# Patient Record
Sex: MALE | Race: WHITE | NOT HISPANIC OR LATINO | Employment: FULL TIME | URBAN - METROPOLITAN AREA
[De-identification: names, ages, dates, MRNs, and addresses within clinical notes are randomized per-mention and may not be internally consistent; named-entity substitution may affect disease eponyms.]

---

## 2018-03-05 ENCOUNTER — NON-PROVIDER VISIT (OUTPATIENT)
Dept: URGENT CARE | Facility: CLINIC | Age: 33
End: 2018-03-05

## 2018-03-05 DIAGNOSIS — Z02.1 PRE-EMPLOYMENT DRUG SCREENING: ICD-10-CM

## 2018-03-05 LAB
AMP AMPHETAMINE: NORMAL
COC COCAINE: NORMAL
INT CON NEG: NORMAL
INT CON POS: NORMAL
MET METHAMPHETAMINES: NORMAL
OPI OPIATES: NORMAL
PCP PHENCYCLIDINE: NORMAL
POC DRUG COMMENT 753798-OCCUPATIONAL HEALTH: NEGATIVE
THC: NORMAL

## 2018-03-05 PROCEDURE — 80305 DRUG TEST PRSMV DIR OPT OBS: CPT | Performed by: PHYSICIAN ASSISTANT

## 2023-05-11 ENCOUNTER — OFFICE VISIT (OUTPATIENT)
Dept: URGENT CARE | Facility: CLINIC | Age: 38
End: 2023-05-11
Payer: COMMERCIAL

## 2023-05-11 VITALS
BODY MASS INDEX: 25.33 KG/M2 | HEIGHT: 72 IN | HEART RATE: 76 BPM | DIASTOLIC BLOOD PRESSURE: 76 MMHG | OXYGEN SATURATION: 95 % | TEMPERATURE: 98.1 F | WEIGHT: 187 LBS | SYSTOLIC BLOOD PRESSURE: 118 MMHG | RESPIRATION RATE: 16 BRPM

## 2023-05-11 DIAGNOSIS — J06.9 VIRAL URI: ICD-10-CM

## 2023-05-11 PROCEDURE — 3078F DIAST BP <80 MM HG: CPT | Performed by: PHYSICIAN ASSISTANT

## 2023-05-11 PROCEDURE — 3074F SYST BP LT 130 MM HG: CPT | Performed by: PHYSICIAN ASSISTANT

## 2023-05-11 PROCEDURE — 99203 OFFICE O/P NEW LOW 30 MIN: CPT | Performed by: PHYSICIAN ASSISTANT

## 2023-05-11 ASSESSMENT — ENCOUNTER SYMPTOMS
RHINORRHEA: 0
WHEEZING: 0
CARDIOVASCULAR NEGATIVE: 1
ABDOMINAL PAIN: 0
COUGH: 1
SORE THROAT: 0
DIARRHEA: 0
CHILLS: 0
NAUSEA: 0
FEVER: 0
VOMITING: 0
SHORTNESS OF BREATH: 0

## 2023-05-11 NOTE — LETTER
May 11, 2023         Patient: Bolivar Gary   YOB: 1985   Date of Visit: 5/11/2023           To Whom it May Concern:    Bolivar Gary was seen in my clinic on 5/11/2023. He may return to work on Friday May 12th. Please excuse prior absences.    If you have any questions or concerns, please don't hesitate to call.        Sincerely,           Bipin Marroquin P.A.-C.  Electronically Signed

## 2023-05-12 NOTE — PROGRESS NOTES
Subjective     Bolivar Gary is a 37 y.o. male who presents with Letter for School/Work (Work note)            Viral URI over the weekend.  Symptoms have completely resolved.  No concerns today.  Eating and drinking normal.  Needs note for work.    Cough  This is a new problem. The current episode started in the past 7 days. The problem has been resolved. The problem occurs every few minutes. The cough is Non-productive. Pertinent negatives include no chest pain, chills, ear pain, fever, nasal congestion, postnasal drip, rhinorrhea, sore throat, shortness of breath or wheezing. He has tried nothing for the symptoms. The treatment provided no relief. There is no history of asthma or pneumonia.         PMH:  has no past medical history on file.  MEDS: No current outpatient medications on file.  ALLERGIES: No Known Allergies  SURGHX: History reviewed. No pertinent surgical history.  SOCHX:  reports that he has never smoked. He has never used smokeless tobacco. He reports that he does not currently use alcohol. He reports current drug use. Drugs: Inhaled and Marijuana.  FH: family history is not on file.      Review of Systems   Constitutional:  Negative for chills and fever.   HENT:  Positive for congestion. Negative for ear pain, postnasal drip, rhinorrhea and sore throat.    Respiratory:  Positive for cough. Negative for shortness of breath and wheezing.    Cardiovascular: Negative.  Negative for chest pain.   Gastrointestinal:  Negative for abdominal pain, diarrhea, nausea and vomiting.       Medications, Allergies, and current problem list reviewed today in Epic           Objective     /76   Pulse 76   Temp 36.7 °C (98.1 °F) (Temporal)   Resp 16   Ht 1.829 m (6')   Wt 84.8 kg (187 lb)   SpO2 95%   BMI 25.36 kg/m²      Physical Exam  Vitals and nursing note reviewed.   Constitutional:       General: He is not in acute distress.     Appearance: Normal appearance. He is well-developed. He is not  ill-appearing, toxic-appearing or diaphoretic.   HENT:      Head: Normocephalic and atraumatic.      Right Ear: Tympanic membrane, ear canal and external ear normal.      Left Ear: Tympanic membrane, ear canal and external ear normal.      Nose: Nose normal. No congestion or rhinorrhea.      Mouth/Throat:      Mouth: Mucous membranes are moist.      Pharynx: Oropharynx is clear. No oropharyngeal exudate or posterior oropharyngeal erythema.   Eyes:      General:         Right eye: No discharge.         Left eye: No discharge.      Conjunctiva/sclera: Conjunctivae normal.   Cardiovascular:      Rate and Rhythm: Normal rate and regular rhythm.      Pulses: Normal pulses.      Heart sounds: Normal heart sounds. No murmur heard.  Pulmonary:      Effort: Pulmonary effort is normal. No respiratory distress.      Breath sounds: Normal breath sounds. No wheezing, rhonchi or rales.   Chest:      Chest wall: No tenderness.   Musculoskeletal:         General: No swelling or tenderness.      Cervical back: Normal range of motion and neck supple.      Right lower leg: No edema.      Left lower leg: No edema.   Lymphadenopathy:      Cervical: No cervical adenopathy.   Skin:     General: Skin is warm and dry.   Neurological:      General: No focal deficit present.      Mental Status: He is alert and oriented to person, place, and time. Mental status is at baseline.   Psychiatric:         Mood and Affect: Mood normal.         Behavior: Behavior normal.         Thought Content: Thought content normal.         Judgment: Judgment normal.                             Assessment & Plan     Very pleasant 37-year-old male needs clearance for work.  Had slight viral URI over the weekend and missed a few days of work.  Symptoms have completely resolved and he has no concerns today.  Denies fever, vomiting, diarrhea.  Eating and drinking normal.  Vitals and exam benign.  Clearance for work provided.    1. Viral URI            Return to clinic  or go to ED if symptoms worsen or persist. Red flag symptoms and indications for ED discussed at length. Patient/Parent/Guardian voices understanding. Follow-up with your primary care provider in 3-5 days. All side effects and potential interactions of prescribed medication discussed including allergic response, GI upset, tendon injury, rash, sedation, OCP effectiveness, etc.    Please note that this dictation was created using voice recognition software. I have made every reasonable attempt to correct obvious errors, but I expect that there are errors of grammar and possibly content that I did not discover before finalizing the note.

## 2023-06-11 ENCOUNTER — APPOINTMENT (OUTPATIENT)
Dept: RADIOLOGY | Facility: MEDICAL CENTER | Age: 38
End: 2023-06-11
Attending: EMERGENCY MEDICINE
Payer: COMMERCIAL

## 2023-06-11 ENCOUNTER — HOSPITAL ENCOUNTER (EMERGENCY)
Facility: MEDICAL CENTER | Age: 38
End: 2023-06-11
Attending: EMERGENCY MEDICINE
Payer: COMMERCIAL

## 2023-06-11 VITALS
OXYGEN SATURATION: 97 % | WEIGHT: 176.37 LBS | SYSTOLIC BLOOD PRESSURE: 128 MMHG | BODY MASS INDEX: 23.89 KG/M2 | HEIGHT: 72 IN | DIASTOLIC BLOOD PRESSURE: 78 MMHG | TEMPERATURE: 98 F | HEART RATE: 59 BPM | RESPIRATION RATE: 18 BRPM

## 2023-06-11 DIAGNOSIS — R55 SYNCOPE, UNSPECIFIED SYNCOPE TYPE: ICD-10-CM

## 2023-06-11 DIAGNOSIS — S16.1XXA STRAIN OF NECK MUSCLE, INITIAL ENCOUNTER: ICD-10-CM

## 2023-06-11 LAB
ALBUMIN SERPL BCP-MCNC: 4.9 G/DL (ref 3.2–4.9)
ALBUMIN/GLOB SERPL: 2.1 G/DL
ALP SERPL-CCNC: 111 U/L (ref 30–99)
ALT SERPL-CCNC: 17 U/L (ref 2–50)
ANION GAP SERPL CALC-SCNC: 10 MMOL/L (ref 7–16)
AST SERPL-CCNC: 15 U/L (ref 12–45)
BASOPHILS # BLD AUTO: 0.4 % (ref 0–1.8)
BASOPHILS # BLD: 0.02 K/UL (ref 0–0.12)
BILIRUB SERPL-MCNC: 0.9 MG/DL (ref 0.1–1.5)
BUN SERPL-MCNC: 16 MG/DL (ref 8–22)
CALCIUM ALBUM COR SERPL-MCNC: 8.6 MG/DL (ref 8.5–10.5)
CALCIUM SERPL-MCNC: 9.3 MG/DL (ref 8.5–10.5)
CHLORIDE SERPL-SCNC: 103 MMOL/L (ref 96–112)
CO2 SERPL-SCNC: 25 MMOL/L (ref 20–33)
CREAT SERPL-MCNC: 0.82 MG/DL (ref 0.5–1.4)
EKG IMPRESSION: NORMAL
EOSINOPHIL # BLD AUTO: 0.04 K/UL (ref 0–0.51)
EOSINOPHIL NFR BLD: 0.7 % (ref 0–6.9)
ERYTHROCYTE [DISTWIDTH] IN BLOOD BY AUTOMATED COUNT: 41.7 FL (ref 35.9–50)
GFR SERPLBLD CREATININE-BSD FMLA CKD-EPI: 115 ML/MIN/1.73 M 2
GLOBULIN SER CALC-MCNC: 2.3 G/DL (ref 1.9–3.5)
GLUCOSE SERPL-MCNC: 114 MG/DL (ref 65–99)
HCT VFR BLD AUTO: 47.4 % (ref 42–52)
HGB BLD-MCNC: 16.6 G/DL (ref 14–18)
IMM GRANULOCYTES # BLD AUTO: 0.01 K/UL (ref 0–0.11)
IMM GRANULOCYTES NFR BLD AUTO: 0.2 % (ref 0–0.9)
LYMPHOCYTES # BLD AUTO: 2.29 K/UL (ref 1–4.8)
LYMPHOCYTES NFR BLD: 40.8 % (ref 22–41)
MCH RBC QN AUTO: 29 PG (ref 27–33)
MCHC RBC AUTO-ENTMCNC: 35 G/DL (ref 32.3–36.5)
MCV RBC AUTO: 82.9 FL (ref 81.4–97.8)
MONOCYTES # BLD AUTO: 0.36 K/UL (ref 0–0.85)
MONOCYTES NFR BLD AUTO: 6.4 % (ref 0–13.4)
NEUTROPHILS # BLD AUTO: 2.89 K/UL (ref 1.82–7.42)
NEUTROPHILS NFR BLD: 51.5 % (ref 44–72)
NRBC # BLD AUTO: 0 K/UL
NRBC BLD-RTO: 0 /100 WBC (ref 0–0.2)
PLATELET # BLD AUTO: 280 K/UL (ref 164–446)
PMV BLD AUTO: 8.9 FL (ref 9–12.9)
POTASSIUM SERPL-SCNC: 4.3 MMOL/L (ref 3.6–5.5)
PROT SERPL-MCNC: 7.2 G/DL (ref 6–8.2)
RBC # BLD AUTO: 5.72 M/UL (ref 4.7–6.1)
SODIUM SERPL-SCNC: 138 MMOL/L (ref 135–145)
TROPONIN T SERPL-MCNC: <6 NG/L (ref 6–19)
TROPONIN T SERPL-MCNC: <6 NG/L (ref 6–19)
WBC # BLD AUTO: 5.6 K/UL (ref 4.8–10.8)

## 2023-06-11 PROCEDURE — 96375 TX/PRO/DX INJ NEW DRUG ADDON: CPT

## 2023-06-11 PROCEDURE — 93005 ELECTROCARDIOGRAM TRACING: CPT

## 2023-06-11 PROCEDURE — 72125 CT NECK SPINE W/O DYE: CPT

## 2023-06-11 PROCEDURE — 80053 COMPREHEN METABOLIC PANEL: CPT

## 2023-06-11 PROCEDURE — 84484 ASSAY OF TROPONIN QUANT: CPT | Mod: 91

## 2023-06-11 PROCEDURE — 71045 X-RAY EXAM CHEST 1 VIEW: CPT

## 2023-06-11 PROCEDURE — 93005 ELECTROCARDIOGRAM TRACING: CPT | Performed by: EMERGENCY MEDICINE

## 2023-06-11 PROCEDURE — 99285 EMERGENCY DEPT VISIT HI MDM: CPT

## 2023-06-11 PROCEDURE — A9270 NON-COVERED ITEM OR SERVICE: HCPCS | Performed by: EMERGENCY MEDICINE

## 2023-06-11 PROCEDURE — 700102 HCHG RX REV CODE 250 W/ 637 OVERRIDE(OP): Performed by: EMERGENCY MEDICINE

## 2023-06-11 PROCEDURE — 36415 COLL VENOUS BLD VENIPUNCTURE: CPT

## 2023-06-11 PROCEDURE — 99284 EMERGENCY DEPT VISIT MOD MDM: CPT

## 2023-06-11 PROCEDURE — 700117 HCHG RX CONTRAST REV CODE 255: Performed by: EMERGENCY MEDICINE

## 2023-06-11 PROCEDURE — 70496 CT ANGIOGRAPHY HEAD: CPT

## 2023-06-11 PROCEDURE — 85025 COMPLETE CBC W/AUTO DIFF WBC: CPT

## 2023-06-11 PROCEDURE — 70498 CT ANGIOGRAPHY NECK: CPT

## 2023-06-11 PROCEDURE — 96374 THER/PROPH/DIAG INJ IV PUSH: CPT

## 2023-06-11 PROCEDURE — 700111 HCHG RX REV CODE 636 W/ 250 OVERRIDE (IP): Performed by: EMERGENCY MEDICINE

## 2023-06-11 RX ORDER — OXYCODONE HYDROCHLORIDE AND ACETAMINOPHEN 5; 325 MG/1; MG/1
2 TABLET ORAL ONCE
Status: COMPLETED | OUTPATIENT
Start: 2023-06-11 | End: 2023-06-11

## 2023-06-11 RX ORDER — HYDROMORPHONE HYDROCHLORIDE 1 MG/ML
1 INJECTION, SOLUTION INTRAMUSCULAR; INTRAVENOUS; SUBCUTANEOUS ONCE
Status: COMPLETED | OUTPATIENT
Start: 2023-06-11 | End: 2023-06-11

## 2023-06-11 RX ORDER — CYCLOBENZAPRINE HCL 5 MG
5-10 TABLET ORAL 3 TIMES DAILY PRN
Qty: 30 TABLET | Refills: 0 | Status: SHIPPED | OUTPATIENT
Start: 2023-06-11

## 2023-06-11 RX ORDER — CYCLOBENZAPRINE HCL 10 MG
10 TABLET ORAL ONCE
Status: COMPLETED | OUTPATIENT
Start: 2023-06-11 | End: 2023-06-11

## 2023-06-11 RX ORDER — ONDANSETRON 2 MG/ML
4 INJECTION INTRAMUSCULAR; INTRAVENOUS ONCE
Status: COMPLETED | OUTPATIENT
Start: 2023-06-11 | End: 2023-06-11

## 2023-06-11 RX ADMIN — IOHEXOL 100 ML: 350 INJECTION, SOLUTION INTRAVENOUS at 16:28

## 2023-06-11 RX ADMIN — ONDANSETRON 4 MG: 2 INJECTION INTRAMUSCULAR; INTRAVENOUS at 13:13

## 2023-06-11 RX ADMIN — CYCLOBENZAPRINE 10 MG: 10 TABLET, FILM COATED ORAL at 17:14

## 2023-06-11 RX ADMIN — OXYCODONE AND ACETAMINOPHEN 2 TABLET: 5; 325 TABLET ORAL at 17:13

## 2023-06-11 RX ADMIN — HYDROMORPHONE HYDROCHLORIDE 1 MG: 1 INJECTION, SOLUTION INTRAMUSCULAR; INTRAVENOUS; SUBCUTANEOUS at 13:13

## 2023-06-11 NOTE — ED TRIAGE NOTES
"Chief Complaint   Patient presents with    Neck Pain     Reports left sided neck pain x 15 minutes after going to a chiropractor to get his neck adjusted. Rates pain as 10/10. Describes pain as tightness    Seizure     Reports witnessed seizure by wife while in the car. Per wife pt eyes rolled back, pt \"frozen\" then had full body twitching. Arrived in triage w/gcs of 15. No oral trauma/no urinary incontinence. Denies of hx of seizure    Syncope     Reports dizziness x 30 minutes then just had a syncopal episode. Per wife lasted approximately 2 minutes.     Arrived in triage w/GCS of 15. No report of head trauma.  "

## 2023-06-11 NOTE — ED PROVIDER NOTES
"  ER Provider Note    Scribed for Cheng Schroeder M.D. by Vicenta Davis. 2023  1:03 PM    Primary Care Provider: Pcp Pt States None    CHIEF COMPLAINT  Chief Complaint   Patient presents with    Neck Pain     Reports left sided neck pain x 15 minutes after going to a chiropractor to get his neck adjusted. Rates pain as 10/10. Describes pain as tightness    Seizure     Reports witnessed seizure by wife while in the car. Per wife pt eyes rolled back, pt \"frozen\" then had full body twitching. Arrived in triage w/gcs of 15. No oral trauma/no urinary incontinence. Denies of hx of seizure    Syncope     Reports dizziness x 30 minutes then just had a syncopal episode. Per wife lasted approximately 2 minutes.       HPI/ROS  LIMITATION TO HISTORY   None  OUTSIDE HISTORIAN(S):  Wife is at bedside and provides history about the syncopal episode as stated in the HPI.     Bolivar Gary is a 37 y.o. male who presents to the ED complaining of neck pain and syncopal episodes  onset this morning. Patient reports he was at the chiropractor this morning and within 15 minutes of leaving, he began to experience left sided neck pain. Per wife, the patient had a syncopal episode and witnessed his \"eyes rolled back and he froze up,\" with full body twitching, no jerking, that lasted less than  minute while he was in a car.     She reports that right before this episode, he was complaining of dizziness and \"it being very bright.\" She states he said \"I think I need water, I think I \" immediately after the episode. He adds that he is allergic to Penicillin and codeine. He admits to using marijuana, but denies drinking alcohol or using other drugs.     PAST MEDICAL HISTORY  No past medical history noted.    SURGICAL HISTORY  No past surgical history noted.    FAMILY HISTORY  No family history noted.    SOCIAL HISTORY   reports that he has never smoked. He has never used smokeless tobacco. He reports that he does not currently " use alcohol. He reports current drug use. Drugs: Inhaled and Marijuana.    CURRENT MEDICATIONS  None noted.      ALLERGIES  Patient has no known allergies.    PHYSICAL EXAM  /66   Pulse (!) 49   Temp 36.3 °C (97.3 °F) (Temporal)   Resp 14   Ht 1.829 m (6')   Wt 80 kg (176 lb 5.9 oz)   SpO2 100%   BMI 23.92 kg/m²     Constitutional: Alert in no apparent distress.  HENT: No signs of trauma, Bilateral external ears normal, Nose normal. Uvula midline.   Eyes: Pupils are equal and reactive, Conjunctiva normal, Non-icteric.   Neck: Normal range of motion, No tenderness, Supple, No stridor.   Lymphatic: No lymphadenopathy noted.   Cardiovascular: Regular rate and rhythm, no murmurs.   Thorax & Lungs: Normal breath sounds, No respiratory distress, No wheezing, No chest tenderness.   Abdomen: Bowel sounds normal, Soft, No tenderness, No peritoneal signs, No masses, No pulsatile masses.   Skin: Warm, Dry, No erythema, No rash.   Back: No bony tenderness, No CVA tenderness.   Extremities: Intact distal pulses, No edema, No tenderness, No cyanosis.  Musculoskeletal: Good range of motion in all major joints. No tenderness to palpation or major deformities noted.   Neurologic: Alert , Normal motor function, Normal sensory function, No focal deficits noted.   Psychiatric: Affect normal, Judgment normal, Mood normal.      DIAGNOSTIC STUDIES    Labs:   Labs Reviewed   CBC WITH DIFFERENTIAL - Abnormal; Notable for the following components:       Result Value    MPV 8.9 (*)     All other components within normal limits    Narrative:     Biotin intake of greater than 5 mg per day may interfere with  troponin levels, causing false low values.   COMP METABOLIC PANEL - Abnormal; Notable for the following components:    Glucose 114 (*)     Alkaline Phosphatase 111 (*)     All other components within normal limits    Narrative:     Biotin intake of greater than 5 mg per day may interfere with  troponin levels, causing false low  values.   TROPONIN    Narrative:     Biotin intake of greater than 5 mg per day may interfere with  troponin levels, causing false low values.   TROPONIN    Narrative:     Biotin intake of greater than 5 mg per day may interfere with  troponin levels, causing false low values.   CORRECTED CALCIUM    Narrative:     Biotin intake of greater than 5 mg per day may interfere with  troponin levels, causing false low values.   ESTIMATED GFR    Narrative:     Biotin intake of greater than 5 mg per day may interfere with  troponin levels, causing false low values.        EKG:   I have independently interpreted this EKG       Radiology:   The attending emergency physician has independently interpreted the diagnostic imaging associated with this visit and am waiting the final reading from the radiologist.   Preliminary interpretation is a follows: No BCVI or ICH noted  Radiologist interpretation:   CT-CTA HEAD WITH & W/O-POST PROCESS   Final Result      1.  No evidence of intracranial vascular occlusion or aneurysm.      2.  Small round metallic foreign body within the soft tissues overlying the left temporal bone anterior and superior to the external auditory canal.      CT-CTA NECK WITH & W/O-POST PROCESSING   Final Result      CT angiogram of the neck within normal limits.      CT-CSPINE WITHOUT PLUS RECONS   Final Result      No evidence of cervical spine fracture.      DX-CHEST-PORTABLE (1 VIEW)   Final Result      1.  There is no acute cardiopulmonary process.           COURSE & MEDICAL DECISION MAKING     ED Observation Status? No; Patient does not meet criteria for ED Observation.     INITIAL ASSESSMENT, COURSE AND PLAN  Care Narrative: Patient is a 37 year old male who presents with a chief complaint of neck pain with syncopal episodes.     CTA and CT head obtained given patient's syncopal episode and concern for seizure-like activity  Given unable to rule out B CVI after recent chiropractic manipulation plan for CTA  of head and neck with no obvious blunt cerebrovascular injury  Upon clarifying more with patient and significant other patient immediately recalls waking up after event with no postictal period no self urination no tongue laceration unremarkable electrolytes and immediate return to baseline I highly doubt this represents a seizure  Patient likely with syncopal episode after excruciating pain  Patient prescribed muscle relaxers and instructed to follow-up with primary care with MRI if symptoms worsen or persist or physical therapy  Patient counseled to take alternating Tylenol and ibuprofen and I discussed other options besides muscle relaxer     1:56 PM - Patient seen and examined at bedside. Discussed plan of care, including labs, EKG, and imaging. Patient agrees to the plan of care. The patient will be medicated with Zofran injection 4 mg and Dilaudid injection 1 mg. Ordered for EKG, Troponins, CMP, CBC w/ Diff, DX-Chest, CT-CTA Head w/ and w/o, and CT-CTA Neck w/ and w/o to evaluate his symptoms.      5:09 PM - I reevaluated the patient at bedside. The patient informs me they feel improved following administration. I discussed the patient's diagnostic study results which are reassuring. I informed the patient of the risk of driving. I discussed plan for discharge and follow up as outlined below. The patient is stable for discharge at this time and will return for any new or worsening symptoms. Patient verbalizes understanding and support with my plan for discharge.        DISPOSITION AND DISCUSSIONS    Barriers to care at this time, including but not limited to: Patient does not have established PCP.         FINAL DIAGNOSIS  1. Strain of neck muscle, initial encounter    2. Syncope, unspecified syncope type       I, Vicenta Davis (Jett), am scribing for, and in the presence of, Cheng Schroeder M.D..    Electronically signed by: Vicenta Uriostegui), 6/11/2023    ICheng M.D. personally performed  the services described in this documentation, as scribed by Vicenta Davis in my presence, and it is both accurate and complete.     The note accurately reflects work and decisions made by me.  Cheng Schroeder M.D.  6/11/2023  8:18 PM

## 2023-06-12 NOTE — DISCHARGE INSTRUCTIONS
Please discuss the following findings with your regular doctor:  CT-CTA HEAD WITH & W/O-POST PROCESS   Final Result      1.  No evidence of intracranial vascular occlusion or aneurysm.      2.  Small round metallic foreign body within the soft tissues overlying the left temporal bone anterior and superior to the external auditory canal.      CT-CTA NECK WITH & W/O-POST PROCESSING   Final Result      CT angiogram of the neck within normal limits.      CT-CSPINE WITHOUT PLUS RECONS   Final Result      No evidence of cervical spine fracture.      DX-CHEST-PORTABLE (1 VIEW)   Final Result      1.  There is no acute cardiopulmonary process.        Labs Reviewed   CBC WITH DIFFERENTIAL - Abnormal; Notable for the following components:       Result Value    MPV 8.9 (*)     All other components within normal limits    Narrative:     Biotin intake of greater than 5 mg per day may interfere with  troponin levels, causing false low values.   COMP METABOLIC PANEL - Abnormal; Notable for the following components:    Glucose 114 (*)     Alkaline Phosphatase 111 (*)     All other components within normal limits    Narrative:     Biotin intake of greater than 5 mg per day may interfere with  troponin levels, causing false low values.   TROPONIN    Narrative:     Biotin intake of greater than 5 mg per day may interfere with  troponin levels, causing false low values.   TROPONIN    Narrative:     Biotin intake of greater than 5 mg per day may interfere with  troponin levels, causing false low values.   CORRECTED CALCIUM    Narrative:     Biotin intake of greater than 5 mg per day may interfere with  troponin levels, causing false low values.   ESTIMATED GFR    Narrative:     Biotin intake of greater than 5 mg per day may interfere with  troponin levels, causing false low values.

## 2023-06-12 NOTE — ED NOTES
ERP bedside  
Pt aox4, vss, nad, ambulatory steady  Pt understood all dc info and when to seek medical care, no further questions   20G RFA iv taken out, tip intact  Pt's wife driving pt home    
Pt feeling more comfortable, pain controlled   
Pt to CT  
no

## 2023-06-12 NOTE — DISCHARGE PLANNING
Patient calls today and asks for a new work note as he woke up this AM with neck pain that he felt he would not be able to work with. He is at the pharmacy now picking up Rx and will come to  a new work release note afterwards. Note left at triage with ED Tech.

## 2023-09-12 ENCOUNTER — HOSPITAL ENCOUNTER (EMERGENCY)
Facility: MEDICAL CENTER | Age: 38
End: 2023-09-12
Attending: EMERGENCY MEDICINE
Payer: COMMERCIAL

## 2023-09-12 VITALS
SYSTOLIC BLOOD PRESSURE: 148 MMHG | HEART RATE: 98 BPM | DIASTOLIC BLOOD PRESSURE: 83 MMHG | RESPIRATION RATE: 17 BRPM | OXYGEN SATURATION: 97 % | WEIGHT: 175 LBS | HEIGHT: 72 IN | BODY MASS INDEX: 23.7 KG/M2 | TEMPERATURE: 99.4 F

## 2023-09-12 DIAGNOSIS — R19.7 DIARRHEA, UNSPECIFIED TYPE: ICD-10-CM

## 2023-09-12 LAB
ALBUMIN SERPL BCP-MCNC: 4.9 G/DL (ref 3.2–4.9)
ALBUMIN/GLOB SERPL: 1.7 G/DL
ALP SERPL-CCNC: 99 U/L (ref 30–99)
ALT SERPL-CCNC: 13 U/L (ref 2–50)
ANION GAP SERPL CALC-SCNC: 13 MMOL/L (ref 7–16)
AST SERPL-CCNC: 17 U/L (ref 12–45)
BASOPHILS # BLD AUTO: 0.6 % (ref 0–1.8)
BASOPHILS # BLD: 0.04 K/UL (ref 0–0.12)
BILIRUB SERPL-MCNC: 0.5 MG/DL (ref 0.1–1.5)
BUN SERPL-MCNC: 9 MG/DL (ref 8–22)
CALCIUM ALBUM COR SERPL-MCNC: 9 MG/DL (ref 8.5–10.5)
CALCIUM SERPL-MCNC: 9.7 MG/DL (ref 8.5–10.5)
CHLORIDE SERPL-SCNC: 102 MMOL/L (ref 96–112)
CO2 SERPL-SCNC: 26 MMOL/L (ref 20–33)
CREAT SERPL-MCNC: 0.94 MG/DL (ref 0.5–1.4)
EOSINOPHIL # BLD AUTO: 0.03 K/UL (ref 0–0.51)
EOSINOPHIL NFR BLD: 0.4 % (ref 0–6.9)
ERYTHROCYTE [DISTWIDTH] IN BLOOD BY AUTOMATED COUNT: 41.1 FL (ref 35.9–50)
GFR SERPLBLD CREATININE-BSD FMLA CKD-EPI: 106 ML/MIN/1.73 M 2
GLOBULIN SER CALC-MCNC: 2.9 G/DL (ref 1.9–3.5)
GLUCOSE SERPL-MCNC: 92 MG/DL (ref 65–99)
HCT VFR BLD AUTO: 46.2 % (ref 42–52)
HGB BLD-MCNC: 16 G/DL (ref 14–18)
IMM GRANULOCYTES # BLD AUTO: 0.02 K/UL (ref 0–0.11)
IMM GRANULOCYTES NFR BLD AUTO: 0.3 % (ref 0–0.9)
LIPASE SERPL-CCNC: 27 U/L (ref 11–82)
LYMPHOCYTES # BLD AUTO: 1.5 K/UL (ref 1–4.8)
LYMPHOCYTES NFR BLD: 21.4 % (ref 22–41)
MCH RBC QN AUTO: 29.9 PG (ref 27–33)
MCHC RBC AUTO-ENTMCNC: 34.6 G/DL (ref 32.3–36.5)
MCV RBC AUTO: 86.4 FL (ref 81.4–97.8)
MONOCYTES # BLD AUTO: 0.44 K/UL (ref 0–0.85)
MONOCYTES NFR BLD AUTO: 6.3 % (ref 0–13.4)
NEUTROPHILS # BLD AUTO: 4.98 K/UL (ref 1.82–7.42)
NEUTROPHILS NFR BLD: 71 % (ref 44–72)
NRBC # BLD AUTO: 0 K/UL
NRBC BLD-RTO: 0 /100 WBC (ref 0–0.2)
PLATELET # BLD AUTO: 270 K/UL (ref 164–446)
PMV BLD AUTO: 9 FL (ref 9–12.9)
POTASSIUM SERPL-SCNC: 3.9 MMOL/L (ref 3.6–5.5)
PROT SERPL-MCNC: 7.8 G/DL (ref 6–8.2)
RBC # BLD AUTO: 5.35 M/UL (ref 4.7–6.1)
SODIUM SERPL-SCNC: 141 MMOL/L (ref 135–145)
WBC # BLD AUTO: 7 K/UL (ref 4.8–10.8)

## 2023-09-12 PROCEDURE — 80053 COMPREHEN METABOLIC PANEL: CPT

## 2023-09-12 PROCEDURE — 36415 COLL VENOUS BLD VENIPUNCTURE: CPT

## 2023-09-12 PROCEDURE — 85025 COMPLETE CBC W/AUTO DIFF WBC: CPT

## 2023-09-12 PROCEDURE — 83690 ASSAY OF LIPASE: CPT

## 2023-09-12 PROCEDURE — 99283 EMERGENCY DEPT VISIT LOW MDM: CPT

## 2023-09-12 RX ORDER — DIPHENOXYLATE HYDROCHLORIDE AND ATROPINE SULFATE 2.5; .025 MG/1; MG/1
1 TABLET ORAL 4 TIMES DAILY PRN
Qty: 20 TABLET | Refills: 0 | Status: SHIPPED | OUTPATIENT
Start: 2023-09-12 | End: 2023-09-17

## 2023-09-12 RX ORDER — DICYCLOMINE HCL 20 MG
20 TABLET ORAL EVERY 6 HOURS
Qty: 40 TABLET | Refills: 0 | Status: SHIPPED | OUTPATIENT
Start: 2023-09-12

## 2023-09-12 ASSESSMENT — FIBROSIS 4 INDEX: FIB4 SCORE: 0.49

## 2023-09-12 ASSESSMENT — LIFESTYLE VARIABLES: DO YOU DRINK ALCOHOL: NO

## 2023-09-12 NOTE — ED TRIAGE NOTES
Bolivar Gary.  38 y.o.  Male  Chief Complaint   Patient presents with    Diarrhea     X a few days, pt reports he constantly feels he has to have a BM & has some rectal irritation. Having less frequent diarrhea, once every few hours. Mild abdominal cramping.      Labs ordered. Patient educated on triage process, to alert staff if any changes in condition.

## 2023-09-12 NOTE — ED PROVIDER NOTES
ED Provider Note    CHIEF COMPLAINT  Chief Complaint   Patient presents with    Diarrhea     X a few days, pt reports he constantly feels he has to have a BM & has some rectal irritation. Having less frequent diarrhea, once every few hours. Mild abdominal cramping.        EXTERNAL RECORDS REVIEWED  Reviewed previous ER visits reviewed stool studies performed in [place] in July 2022    HPI/ROS  LIMITATION TO HISTORY   None  OUTSIDE HISTORIAN(S):  Wife provided additional history    Bolivar Gary is a 38 y.o. male who presents for evaluation of nonbloody frequent diarrhea.  The patient reports intermittent mild abdominal cramping but no abdominal pain currently.  Patient is an otherwise healthy 38-year-old gentleman.  He has no stated medical or surgical history no history of surgeries.  He specifically denies high fevers or chills no black or bloody stools.  No recent antibiotic use foreign travel or drinking untreated water.  Patient had a similar episode over a year ago and had stool studies performed in [place] which were negative.  He reports that his perianal area is irritated but no active bleeding.  He is not on any prescription medications.  No known history of irritable bowel syndrome ulcerative colitis.  He has never seen a gastroenterologist.    PAST MEDICAL HISTORY   No significant medical history    SURGICAL HISTORY  patient denies any surgical history  None reported  FAMILY HISTORY  No family history on file.    SOCIAL HISTORY  Social History     Tobacco Use    Smoking status: Never    Smokeless tobacco: Never   Vaping Use    Vaping Use: Some days    Substances: Nicotine, THC    Devices: Pre-filled pod   Substance and Sexual Activity    Alcohol use: Not Currently    Drug use: Yes     Types: Inhaled, Marijuana    Sexual activity: Not on file       CURRENT MEDICATIONS  Home Medications       Reviewed by Lissy Roman R.N. (Registered Nurse) on 09/12/23 at 1541  Med List Status: Not Addressed      Medication Last Dose Status   cyclobenzaprine (FLEXERIL) 5 mg tablet  Active                    ALLERGIES  Allergies   Allergen Reactions    Penicillins Unspecified     unsure       PHYSICAL EXAM  VITAL SIGNS: BP (!) 162/97   Pulse 100   Temp 36.2 °C (97.1 °F) (Temporal)   Resp 18   Ht 1.829 m (6')   Wt 79.4 kg (175 lb)   SpO2 96%   BMI 23.73 kg/m²    Pulse ox interpretation: I interpret this pulse ox as normal.  Constitutional: Alert and oriented x 3, no acute distress  HEENT: Atraumatic normocephalic, pupils are equal round reactive to light extraocular movements are intact. The nares is clear, external ears are normal, mouth shows moist mucous membranes normal dentition for age  Neck: Supple, no JVD no tracheal deviation  Cardiovascular: Regular rate and rhythm no murmur rub or gallop 2+ pulses peripherally x4  Thorax & Lungs: No respiratory distress, no wheezes rales or rhonchi, No chest tenderness.   GI: Soft nontender nondistended positive bowel sounds, no peritoneal signs rectal exam reveals irritated perianal skin without hemorrhage, fistula no hemorrhoids or abscess  Skin: Warm dry no acute rash or lesion  Musculoskeletal: Moving all extremities with full range and 5 of 5 strength no acute  deformity  Neurologic: Cranial nerves III through XII are grossly intact no sensory deficit no cerebellar dysfunction   Psychiatric: Appropriate affect for situation at this time              LABS  Results for orders placed or performed during the hospital encounter of 09/12/23   CBC WITH DIFFERENTIAL   Result Value Ref Range    WBC 7.0 4.8 - 10.8 K/uL    RBC 5.35 4.70 - 6.10 M/uL    Hemoglobin 16.0 14.0 - 18.0 g/dL    Hematocrit 46.2 42.0 - 52.0 %    MCV 86.4 81.4 - 97.8 fL    MCH 29.9 27.0 - 33.0 pg    MCHC 34.6 32.3 - 36.5 g/dL    RDW 41.1 35.9 - 50.0 fL    Platelet Count 270 164 - 446 K/uL    MPV 9.0 9.0 - 12.9 fL    Neutrophils-Polys 71.00 44.00 - 72.00 %    Lymphocytes 21.40 (L) 22.00 - 41.00 %     Monocytes 6.30 0.00 - 13.40 %    Eosinophils 0.40 0.00 - 6.90 %    Basophils 0.60 0.00 - 1.80 %    Immature Granulocytes 0.30 0.00 - 0.90 %    Nucleated RBC 0.00 0.00 - 0.20 /100 WBC    Neutrophils (Absolute) 4.98 1.82 - 7.42 K/uL    Lymphs (Absolute) 1.50 1.00 - 4.80 K/uL    Monos (Absolute) 0.44 0.00 - 0.85 K/uL    Eos (Absolute) 0.03 0.00 - 0.51 K/uL    Baso (Absolute) 0.04 0.00 - 0.12 K/uL    Immature Granulocytes (abs) 0.02 0.00 - 0.11 K/uL    NRBC (Absolute) 0.00 K/uL   COMP METABOLIC PANEL   Result Value Ref Range    Sodium 141 135 - 145 mmol/L    Potassium 3.9 3.6 - 5.5 mmol/L    Chloride 102 96 - 112 mmol/L    Co2 26 20 - 33 mmol/L    Anion Gap 13.0 7.0 - 16.0    Glucose 92 65 - 99 mg/dL    Bun 9 8 - 22 mg/dL    Creatinine 0.94 0.50 - 1.40 mg/dL    Calcium 9.7 8.5 - 10.5 mg/dL    Correct Calcium 9.0 8.5 - 10.5 mg/dL    AST(SGOT) 17 12 - 45 U/L    ALT(SGPT) 13 2 - 50 U/L    Alkaline Phosphatase 99 30 - 99 U/L    Total Bilirubin 0.5 0.1 - 1.5 mg/dL    Albumin 4.9 3.2 - 4.9 g/dL    Total Protein 7.8 6.0 - 8.2 g/dL    Globulin 2.9 1.9 - 3.5 g/dL    A-G Ratio 1.7 g/dL   LIPASE   Result Value Ref Range    Lipase 27 11 - 82 U/L   ESTIMATED GFR   Result Value Ref Range    GFR (CKD-EPI) 106 >60 mL/min/1.73 m 2        RADIOLOGY  Considered CT scan of the abdomen pelvis not performed  COURSE & MEDICAL DECISION MAKING    ED Observation Status? No; Patient does not meet criteria for ED Observation.     INITIAL ASSESSMENT, COURSE AND PLAN  Care Narrative:     This is a very pleasant 38-year-old gentleman presents here with crampy intermittent abdominal pain and nonbloody diarrhea.  He has reassuring vital signs.  Specifically no high fever tachycardia or hypotension.  On exam he has some waves of crampy pain but no peritoneal signs.  Specifically no right lower quadrant discomfort.  He has no risk factors for exotic source of diarrhea such as C. difficile or bacterial enteritis without any antibiotic use, foreign travel  drinking untreated water.  He has no leukocytosis anemia or bandemia metabolic panel or CBC on CBC and metabolic panel including LFTs and lipase are entirely normal.  I did discuss with the patient possibility of performing CT scan of the abdomen pelvis.  He would like to hold off for now which I think is reasonable.  This appears to be likely a self-limiting viral process.  I will provide a prescription of Bentyl for intermittent cramps and some Lomotil and recommend he return in 12 to 24 hours if symptoms progress or worsen.  At that point we may need to escalate work-up.  The patient is also aware that his PCP may need to refer him to gastroenterology if symptoms continue      ADDITIONAL PROBLEM LIST    DISPOSITION AND DISCUSSIONS  I have discussed management of the patient with the following physicians and KATHERIN's: None    Discussion of management with other QHP or appropriate source(s): None    Escalation of care considered, and ultimately not performed:IV fluids and diagnostic imaging    Barriers to care at this time, including but not limited to: None    Decision tools and prescription drugs considered including, but not limited to: Patient will be prescribed Bentyl and Lomotil    FINAL DIAGNOSIS  Acute diarrheal illness     In prescribing controlled substances to this patient, I certify that I have obtained and reviewed the medical history of Bolivar Gary. I have also made a good lalo effort to obtain applicable records from other providers who have treated the patient and records did not demonstrate any increased risk of substance abuse that would prevent me from prescribing controlled substances.     I have conducted a physical exam and documented it. I have reviewed Mr. Gary’s prescription history as maintained by the [place] Prescription Monitoring Program.     I have assessed the patient’s risk for abuse, dependency, and addiction using the validated Opioid Risk Tool available at  https://www.SumAll.com/bdowaa-oesi-qhaj-ort-narcotic-abuse.     Given the above, I believe the benefits of controlled substance therapy outweigh the risks. The reasons for prescribing controlled substances include non-narcotic, oral analgesic alternatives have been inadequate for pain control. Accordingly, I have discussed the risk and benefits, treatment plan, and alternative therapies with the patient.     Electronically signed by: Anders Sanchez M.D., 9/12/2023 4:48 PM

## 2023-09-13 NOTE — DISCHARGE PLANNING
Patient calls and LVM requesting RX for mesalamine because he Googled it and feels that is a better Rx for him based on his self diagnosis also from [employer].     Returned patient call. Patient does have a PCP at [facility] but does not have a GI specialist at this time. Pt encouraged to follow post DC instructions and to return for worsening symptoms and/or to call his PCP for FU.     Patient notified that the same MD is not on service at this time. He asks if he can call back then, pt encouraged to follow above instructions. He states understanding.

## 2023-09-14 NOTE — DISCHARGE PLANNING
"Pt calls again today asking to speak with Dr. Sanchez re: prescribing mesalamine for him secondary to his [employer] search. Patient states he cannot return to work secondary to the \"severe diarrhea\" that he is experiencing. Patient again encouraged to follow same instructions given yesterday for new or worsening condition to return to the ER. Patient states understanding and states he will wait for his wife's return to bring him here.  "

## 2023-10-04 ENCOUNTER — OFFICE VISIT (OUTPATIENT)
Dept: URGENT CARE | Facility: CLINIC | Age: 38
End: 2023-10-04
Payer: COMMERCIAL

## 2023-10-04 VITALS
WEIGHT: 176.37 LBS | OXYGEN SATURATION: 95 % | BODY MASS INDEX: 23.89 KG/M2 | SYSTOLIC BLOOD PRESSURE: 108 MMHG | HEART RATE: 108 BPM | TEMPERATURE: 98.7 F | HEIGHT: 72 IN | RESPIRATION RATE: 16 BRPM | DIASTOLIC BLOOD PRESSURE: 78 MMHG

## 2023-10-04 DIAGNOSIS — H92.09 OTALGIA, UNSPECIFIED LATERALITY: ICD-10-CM

## 2023-10-04 DIAGNOSIS — K08.89 DENTALGIA: ICD-10-CM

## 2023-10-04 DIAGNOSIS — H69.93 DYSFUNCTION OF BOTH EUSTACHIAN TUBES: ICD-10-CM

## 2023-10-04 PROCEDURE — 3074F SYST BP LT 130 MM HG: CPT | Performed by: PHYSICIAN ASSISTANT

## 2023-10-04 PROCEDURE — 3078F DIAST BP <80 MM HG: CPT | Performed by: PHYSICIAN ASSISTANT

## 2023-10-04 PROCEDURE — 99213 OFFICE O/P EST LOW 20 MIN: CPT | Performed by: PHYSICIAN ASSISTANT

## 2023-10-04 RX ORDER — METHYLPREDNISOLONE 4 MG/1
TABLET ORAL
Qty: 1 EACH | Refills: 0 | Status: SHIPPED | OUTPATIENT
Start: 2023-10-04

## 2023-10-04 RX ORDER — CEFDINIR 300 MG/1
300 CAPSULE ORAL 2 TIMES DAILY
Qty: 14 CAPSULE | Refills: 0 | Status: SHIPPED | OUTPATIENT
Start: 2023-10-04 | End: 2023-10-11

## 2023-10-04 ASSESSMENT — ENCOUNTER SYMPTOMS
CHILLS: 0
COUGH: 0
NAUSEA: 0
SORE THROAT: 0
VOMITING: 0
FEVER: 0

## 2023-10-04 ASSESSMENT — FIBROSIS 4 INDEX: FIB4 SCORE: 0.66

## 2023-10-04 NOTE — LETTER
[address]  [facility] [address]     October 4, 2023    Patient: Bolivar Gary   YOB: 1985   Date of Visit: 10/4/2023       To Whom It May Concern:    Bolivar Gary was seen and treated in our department on 10/4/2023.  He should be excused from missed work for today.    Sincerely,     Denis Love P.A.-C.

## 2023-10-04 NOTE — PROGRESS NOTES
Subjective:   Bolivar Gary  is a 38 y.o. male who presents for Otalgia (Ear pain/Pressure in LT ear. )      Otalgia   Pertinent negatives include no coughing, ear discharge, hearing loss, sore throat or vomiting.   Patient presents urgent care complaining of pressure and fullness to left ear this been present for the last 2 days.  States he did have a few moments of vertigo 2 days ago that has since resolved.  He denies a history of adulthood AOM.  He did try peroxide and warm water rinse to left ear with minimal improvement.  He denies otherwise feeling sick.  He denies fevers chills cough or sore throat.  Denies right ear symptoms.  Denies abnormal ear drainage.  In addition to ear pain patient complains of a tooth that is cracked and causing pain.  Unsure if pain to ears associated with dental issues as there adjacent to each other.    Review of Systems   Constitutional:  Negative for chills and fever.   HENT:  Positive for congestion and ear pain. Negative for ear discharge, hearing loss, sore throat and tinnitus.         Dental pain   Respiratory:  Negative for cough.    Gastrointestinal:  Negative for nausea and vomiting.       Allergies   Allergen Reactions    Codeine Vomiting    Penicillins Unspecified     unsure        Objective:   /78   Pulse (!) 108   Temp 37.1 °C (98.7 °F) (Temporal)   Resp 16   Ht 1.829 m (6')   Wt 80 kg (176 lb 5.9 oz)   SpO2 95%   BMI 23.92 kg/m²     Physical Exam  Vitals and nursing note reviewed.   Constitutional:       General: He is not in acute distress.     Appearance: Normal appearance. He is well-developed. He is not diaphoretic.   HENT:      Head: Normocephalic and atraumatic.      Right Ear: Ear canal and external ear normal. Tympanic membrane is erythematous and bulging.      Left Ear: Ear canal and external ear normal. Tympanic membrane is erythematous and bulging.      Nose: Nose normal.      Mouth/Throat:      Lips: Pink.      Mouth: Mucous membranes  are moist.      Dentition: Abnormal dentition. Does not have dentures. Dental tenderness and dental caries present. No gingival swelling, dental abscesses or gum lesions.      Pharynx: Uvula midline. Posterior oropharyngeal erythema ( mild PND) present. No oropharyngeal exudate.      Tonsils: No tonsillar abscesses.   Eyes:      General: No scleral icterus.        Right eye: No discharge.         Left eye: No discharge.      Conjunctiva/sclera: Conjunctivae normal.   Pulmonary:      Effort: Pulmonary effort is normal. No respiratory distress.      Breath sounds: Normal breath sounds. No stridor. No decreased breath sounds, wheezing, rhonchi or rales.   Musculoskeletal:         General: Normal range of motion.      Cervical back: Neck supple.   Skin:     General: Skin is warm and dry.      Coloration: Skin is not pale.   Neurological:      Mental Status: He is alert and oriented to person, place, and time.      Coordination: Coordination normal.         Assessment/Plan:   1. Otalgia, unspecified laterality  - methylPREDNISolone (MEDROL DOSEPAK) 4 MG Tablet Therapy Pack; Take as directed on package.  Dispense one package.  Dispense: 1 Each; Refill: 0  - cefdinir (OMNICEF) 300 MG Cap; Take 1 Capsule by mouth 2 times a day for 7 days.  Dispense: 14 Capsule; Refill: 0    2. Dysfunction of both eustachian tubes  - methylPREDNISolone (MEDROL DOSEPAK) 4 MG Tablet Therapy Pack; Take as directed on package.  Dispense one package.  Dispense: 1 Each; Refill: 0    3. Dentalgia  - cefdinir (OMNICEF) 300 MG Cap; Take 1 Capsule by mouth 2 times a day for 7 days.  Dispense: 14 Capsule; Refill: 0  Supportive care is reviewed with patient/caregiver - recommend to push PO fluids and electrolytes,  take full course of Rx, take with probiotics, observe for resolution  Return to clinic with lack of resolution or progression of symptoms.  Broken tooth with some erythema and edema adjacent to ear with deeper erythema and bulging  bilaterally.  Will cover for eustachian tube dysfunction with corticosteroids and cover for dental infection with Omnicef    I have worn an N95 mask, gloves and eye protection for the entire encounter with this patient.     Differential diagnosis, natural history, supportive care, and indications for immediate follow-up discussed.

## 2024-07-19 ENCOUNTER — OFFICE VISIT (OUTPATIENT)
Dept: URGENT CARE | Facility: CLINIC | Age: 39
End: 2024-07-19
Payer: COMMERCIAL

## 2024-07-19 VITALS
HEART RATE: 74 BPM | RESPIRATION RATE: 16 BRPM | HEIGHT: 72 IN | DIASTOLIC BLOOD PRESSURE: 76 MMHG | SYSTOLIC BLOOD PRESSURE: 124 MMHG | BODY MASS INDEX: 23.7 KG/M2 | OXYGEN SATURATION: 95 % | WEIGHT: 175 LBS | TEMPERATURE: 97.5 F

## 2024-07-19 DIAGNOSIS — G57.11 MERALGIA PARESTHETICA OF RIGHT SIDE: ICD-10-CM

## 2024-07-19 PROCEDURE — 99213 OFFICE O/P EST LOW 20 MIN: CPT | Performed by: NURSE PRACTITIONER

## 2024-07-19 PROCEDURE — 3074F SYST BP LT 130 MM HG: CPT | Performed by: NURSE PRACTITIONER

## 2024-07-19 PROCEDURE — 3078F DIAST BP <80 MM HG: CPT | Performed by: NURSE PRACTITIONER

## 2024-07-19 RX ORDER — METHYLPREDNISOLONE 4 MG/1
TABLET ORAL
Qty: 21 TABLET | Refills: 0 | Status: SHIPPED | OUTPATIENT
Start: 2024-07-19

## 2024-07-19 ASSESSMENT — FIBROSIS 4 INDEX: FIB4 SCORE: 0.68

## 2024-07-23 ENCOUNTER — HOSPITAL ENCOUNTER (EMERGENCY)
Facility: MEDICAL CENTER | Age: 39
End: 2024-07-23
Attending: EMERGENCY MEDICINE
Payer: COMMERCIAL

## 2024-07-23 ENCOUNTER — PHARMACY VISIT (OUTPATIENT)
Dept: PHARMACY | Facility: MEDICAL CENTER | Age: 39
End: 2024-07-23
Payer: COMMERCIAL

## 2024-07-23 VITALS
OXYGEN SATURATION: 94 % | SYSTOLIC BLOOD PRESSURE: 116 MMHG | BODY MASS INDEX: 24.34 KG/M2 | TEMPERATURE: 97.3 F | WEIGHT: 179.68 LBS | RESPIRATION RATE: 16 BRPM | DIASTOLIC BLOOD PRESSURE: 70 MMHG | HEART RATE: 84 BPM | HEIGHT: 72 IN

## 2024-07-23 DIAGNOSIS — M79.604 RIGHT LEG PAIN: ICD-10-CM

## 2024-07-23 DIAGNOSIS — S76.011A STRAIN OF RIGHT HIP, INITIAL ENCOUNTER: ICD-10-CM

## 2024-07-23 PROCEDURE — 99282 EMERGENCY DEPT VISIT SF MDM: CPT

## 2024-07-23 PROCEDURE — RXMED WILLOW AMBULATORY MEDICATION CHARGE: Performed by: EMERGENCY MEDICINE

## 2024-07-23 RX ORDER — LIDOCAINE 50 MG/G
1 PATCH TOPICAL EVERY 24 HOURS
Qty: 10 PATCH | Refills: 0 | Status: SHIPPED | OUTPATIENT
Start: 2024-07-23

## 2024-07-23 RX ORDER — CYCLOBENZAPRINE HCL 10 MG
10 TABLET ORAL 3 TIMES DAILY PRN
Qty: 30 TABLET | Refills: 0 | Status: SHIPPED | OUTPATIENT
Start: 2024-07-23

## 2024-07-23 RX ORDER — NAPROXEN 500 MG/1
500 TABLET ORAL 2 TIMES DAILY WITH MEALS
Qty: 60 TABLET | Refills: 0 | Status: SHIPPED | OUTPATIENT
Start: 2024-07-23

## 2024-07-23 ASSESSMENT — FIBROSIS 4 INDEX: FIB4 SCORE: 0.68

## 2024-08-07 ENCOUNTER — OFFICE VISIT (OUTPATIENT)
Dept: URGENT CARE | Facility: CLINIC | Age: 39
End: 2024-08-07
Payer: COMMERCIAL

## 2024-08-07 VITALS
BODY MASS INDEX: 24.24 KG/M2 | HEIGHT: 72 IN | OXYGEN SATURATION: 96 % | SYSTOLIC BLOOD PRESSURE: 118 MMHG | HEART RATE: 82 BPM | TEMPERATURE: 98.7 F | RESPIRATION RATE: 18 BRPM | DIASTOLIC BLOOD PRESSURE: 60 MMHG | WEIGHT: 179 LBS

## 2024-08-07 DIAGNOSIS — J22 LRTI (LOWER RESPIRATORY TRACT INFECTION): ICD-10-CM

## 2024-08-07 LAB
FLUAV RNA SPEC QL NAA+PROBE: NEGATIVE
FLUBV RNA SPEC QL NAA+PROBE: NEGATIVE
RSV RNA SPEC QL NAA+PROBE: NEGATIVE
SARS-COV-2 RNA RESP QL NAA+PROBE: NEGATIVE

## 2024-08-07 PROCEDURE — 3074F SYST BP LT 130 MM HG: CPT | Performed by: PHYSICIAN ASSISTANT

## 2024-08-07 PROCEDURE — 0241U POCT CEPHEID COV-2, FLU A/B, RSV - PCR: CPT | Performed by: PHYSICIAN ASSISTANT

## 2024-08-07 PROCEDURE — 99213 OFFICE O/P EST LOW 20 MIN: CPT | Performed by: PHYSICIAN ASSISTANT

## 2024-08-07 PROCEDURE — 3078F DIAST BP <80 MM HG: CPT | Performed by: PHYSICIAN ASSISTANT

## 2024-08-07 RX ORDER — METHYLPREDNISOLONE 4 MG
TABLET, DOSE PACK ORAL
Qty: 21 TABLET | Refills: 0 | Status: SHIPPED | OUTPATIENT
Start: 2024-08-07 | End: 2024-08-07 | Stop reason: SDUPTHER

## 2024-08-07 RX ORDER — DOXYCYCLINE HYCLATE 100 MG
100 TABLET ORAL 2 TIMES DAILY
Qty: 14 TABLET | Refills: 0 | Status: SHIPPED | OUTPATIENT
Start: 2024-08-07 | End: 2024-08-14

## 2024-08-07 RX ORDER — DOXYCYCLINE HYCLATE 100 MG
100 TABLET ORAL 2 TIMES DAILY
Qty: 14 TABLET | Refills: 0 | Status: SHIPPED | OUTPATIENT
Start: 2024-08-07 | End: 2024-08-07 | Stop reason: SDUPTHER

## 2024-08-07 RX ORDER — METHYLPREDNISOLONE 4 MG
TABLET, DOSE PACK ORAL
Qty: 21 TABLET | Refills: 0 | Status: SHIPPED | OUTPATIENT
Start: 2024-08-07

## 2024-08-07 ASSESSMENT — ENCOUNTER SYMPTOMS
VOMITING: 0
FEVER: 1
HEMOPTYSIS: 0
NAUSEA: 0
SORE THROAT: 1
RHINORRHEA: 1
WHEEZING: 1
SHORTNESS OF BREATH: 0
SPUTUM PRODUCTION: 1
DIARRHEA: 0
MYALGIAS: 1
ABDOMINAL PAIN: 0
CHILLS: 1
HEADACHES: 0
SINUS PAIN: 0

## 2024-08-07 ASSESSMENT — FIBROSIS 4 INDEX: FIB4 SCORE: 0.68

## 2024-08-07 NOTE — PROGRESS NOTES
Subjective     Bolivar Gary is a 39 y.o. male who presents with Cough (X 4-5 days ), Nasal Congestion, and Diarrhea            Cough  This is a new problem. Episode onset: 4-5 days. The problem has been gradually worsening. The problem occurs constantly. The cough is Productive of purulent sputum. Associated symptoms include chills, a fever (subjective), myalgias, nasal congestion, rhinorrhea, a sore throat and wheezing. Pertinent negatives include no chest pain, ear pain, headaches, hemoptysis or shortness of breath. The symptoms are aggravated by lying down. He has tried nothing for the symptoms.       No past medical history on file.    No past surgical history on file.    No family history on file.    Allergies  Codeine and Penicillins    Medications, Allergies, and current problem list reviewed today in Epic      Review of Systems   Constitutional:  Positive for chills, fever (subjective) and malaise/fatigue.   HENT:  Positive for congestion, rhinorrhea and sore throat. Negative for ear pain and sinus pain.    Respiratory:  Positive for cough, sputum production and wheezing. Negative for hemoptysis and shortness of breath.    Cardiovascular:  Negative for chest pain and leg swelling.   Gastrointestinal:  Negative for abdominal pain, diarrhea, nausea and vomiting.   Musculoskeletal:  Positive for myalgias.   Neurological:  Negative for headaches.        All other systems reviewed and are negative.         Objective     /60   Pulse 82   Temp 37.1 °C (98.7 °F) (Temporal)   Resp 18   Ht 1.829 m (6')   Wt 81.2 kg (179 lb)   SpO2 96%   BMI 24.28 kg/m²      Physical Exam  Constitutional:       General: He is not in acute distress.     Appearance: He is not ill-appearing.   HENT:      Head: Normocephalic and atraumatic.      Mouth/Throat:      Mouth: Mucous membranes are moist.      Pharynx: Posterior oropharyngeal erythema (mild) present. No oropharyngeal exudate.   Eyes:      Conjunctiva/sclera:  Conjunctivae normal.   Cardiovascular:      Rate and Rhythm: Normal rate and regular rhythm.      Heart sounds: Normal heart sounds.   Pulmonary:      Effort: Pulmonary effort is normal. No respiratory distress.      Breath sounds: No stridor. No wheezing, rhonchi or rales.      Comments: spasmodic cough  Lymphadenopathy:      Cervical: No cervical adenopathy.   Skin:     General: Skin is warm and dry.   Neurological:      General: No focal deficit present.      Mental Status: He is alert and oriented to person, place, and time.   Psychiatric:         Mood and Affect: Mood normal.         Thought Content: Thought content normal.         Judgment: Judgment normal.                           Assessment & Plan        1. LRTI (lower respiratory tract infection)    - POCT CoV-2, Flu A/B, RSV by PCR- negative  - methylPREDNISolone (MEDROL DOSEPAK) 4 MG Tablet Therapy Pack; Follow schedule on package instructions.  Dispense: 21 Tablet; Refill: 0  - doxycycline (VIBRAMYCIN) 100 MG Tab; Take 1 Tablet by mouth 2 times a day for 7 days.  Dispense: 14 Tablet; Refill: 0    Differential diagnoses, Supportive care, and indications for immediate follow-up discussed with patient.   Pathogenesis of diagnosis discussed including typical length and natural progression.   Instructed to return to clinic or nearest emergency department for any change in condition, further concerns, or worsening of symptoms.    The patient demonstrated a good understanding and agreed with the treatment plan.    Kiley Altamirano P.A.-C.

## 2024-08-07 NOTE — LETTER
August 7, 2024         Patient: Bolivar Gary   YOB: 1985   Date of Visit: 8/7/2024           To Whom it May Concern:    Bolivar Gary was seen in my clinic on 8/7/2024. He should be excused from work 8/5/24-8/9/24 due to medical reasons.     If you have any questions or concerns, please don't hesitate to call.        Sincerely,           Kiley Altamirano P.A.-C.  Electronically Signed

## 2024-11-05 ENCOUNTER — PHARMACY VISIT (OUTPATIENT)
Dept: PHARMACY | Facility: MEDICAL CENTER | Age: 39
End: 2024-11-05
Payer: COMMERCIAL

## 2024-11-05 ENCOUNTER — HOSPITAL ENCOUNTER (EMERGENCY)
Facility: MEDICAL CENTER | Age: 39
End: 2024-11-05
Attending: EMERGENCY MEDICINE
Payer: COMMERCIAL

## 2024-11-05 VITALS
WEIGHT: 188.71 LBS | OXYGEN SATURATION: 97 % | BODY MASS INDEX: 25.56 KG/M2 | HEIGHT: 72 IN | SYSTOLIC BLOOD PRESSURE: 112 MMHG | HEART RATE: 82 BPM | DIASTOLIC BLOOD PRESSURE: 64 MMHG | RESPIRATION RATE: 16 BRPM | TEMPERATURE: 97 F

## 2024-11-05 DIAGNOSIS — M54.12 CERVICAL RADICULOPATHY: ICD-10-CM

## 2024-11-05 PROCEDURE — 99282 EMERGENCY DEPT VISIT SF MDM: CPT

## 2024-11-05 PROCEDURE — RXMED WILLOW AMBULATORY MEDICATION CHARGE: Performed by: EMERGENCY MEDICINE

## 2024-11-05 RX ORDER — CYCLOBENZAPRINE HCL 10 MG
10 TABLET ORAL 3 TIMES DAILY PRN
Qty: 15 TABLET | Refills: 1 | Status: SHIPPED | OUTPATIENT
Start: 2024-11-05

## 2024-11-05 ASSESSMENT — FIBROSIS 4 INDEX: FIB4 SCORE: 0.68

## 2024-11-05 ASSESSMENT — PAIN DESCRIPTION - PAIN TYPE: TYPE: ACUTE PAIN

## 2024-11-05 NOTE — ED PROVIDER NOTES
ED Provider Note    CHIEF COMPLAINT  Chief Complaint   Patient presents with    Arm Pain     Left arm numbness x 1 day       EXTERNAL RECORDS REVIEWED  Outpatient labs & studies CT/CTA of the head and neck and CT cervical spine results from June 2023 reviewed.    HPI/ROS  LIMITATION TO HISTORY   Select: : None    OUTSIDE HISTORIAN(S):  Significant other patient's wife, Anna, and toddler daughter, Obdulia Gary is a 39 y.o. male who presents for left arm numbness/paresthesias.    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  He wears wrist splints at night and sleeps on his back.  He awoke this am with numbness/tingling from his left shoulder down.  He does not believe he was sleeping in a different position today.  He has some baseline  weakness due to swelling and possibly the carpal tunnel syndrome that has been ongoing.  No new weakness today.    Patient reports some neck pain in the past and feels there may be some discomfort in his left trapezius.    Patient denies chest pain, shortness of breath, nausea, vomiting, trauma, weakness, diaphoresis,,headache, neck trauma.    PAST MEDICAL HISTORY     Carpal tunnel syndrome (bilateral)    SURGICAL HISTORY  patient denies any surgical history    FAMILY HISTORY  History reviewed. No pertinent family history.    SOCIAL HISTORY  Social History     Tobacco Use    Smoking status: Never    Smokeless tobacco: Never   Vaping Use    Vaping status: Former    Substances: Nicotine, THC    Devices: Pre-filled pod   Substance and Sexual Activity    Alcohol use: Not Currently     Comment: occ    Drug use: Yes     Types: Inhaled, Marijuana    Sexual activity: Not on file       CURRENT MEDICATIONS  Home Medications       Reviewed by Teresa Patiño R.N. (Registered Nurse) on 11/05/24 at 1417  Med List Status: Not Addressed     Medication Last Dose Status    methylPREDNISolone (MEDROL DOSEPAK) 4 MG Tablet Therapy Pack  Active                    ALLERGIES  Allergies   Allergen Reactions    Codeine Vomiting    Penicillins Unspecified     unsure       PHYSICAL EXAM  VITAL SIGNS: /64   Pulse 82   Temp 36.1 °C (97 °F) (Temporal)   Resp 16   Ht 1.829 m (6')   Wt 85.6 kg (188 lb 11.4 oz)   SpO2 97%   BMI 25.59 kg/m²    General:  WDWN male, nontoxic appearing in NAD; A+Ox3; V/S as above; afebrile  Skin: warm and dry; good color; no rash  HEENT: NCAT; EOMs intact; PERRL; no scleral icterus   Neck: FROM; no midline tenderness  Extremities: LAMA x 4; no tenderness in the left upper extremity; patient with leathery hands, minimal swelling diffusely of the finger joints bilaterally; strength equal; radial pulse 2+  Neurologic: CNs III-XII grossly intact; speech clear; distal sensation intact;  strength 4/5 UE  Psychiatric: Appropriate affect, normal mood      EKG/LABS  none    RADIOLOGY/PROCEDURES   None    COURSE & MEDICAL DECISION MAKING    ASSESSMENT, COURSE AND PLAN  Care Narrative: This is a 39-year-old male with what sounds like history of bilateral carpal tunnel syndrome that awoke this morning with left shoulder and arm numbness.  He has had numbness in his hands for a long time and wears splints and sleeps on his back to improve his symptoms although they sound fairly chronic and moderate to severe in nature.  The patient has not been evaluated by a hand/orthopedic specialist for this.  His new symptoms today are likely stemming from a cervical radiculopathy.  I do not suspect ACS or CVA.  I do not suspect thoracic aortic dissection.  I also do not suspect a septic joint or infection in the arm.  The numbness has improved slightly since this morning when he woke up but is not worsened by exertion and he has no profound weakness of the arm.  We discussed following up with orthopedics regarding his carpal tunnel syndrome, establishing care with a primary  care provider and obtaining some MRI imaging of his neck and shoulder.  Perhaps he has rotator cuff injury.    DISPOSITION AND DISCUSSIONS  Barriers to care at this time, including but not limited to: Patient does not have established PCP.     FINAL DIAGNOSIS  1. Cervical radiculopathy         Electronically signed by: Kylee Mccall M.D., 11/5/2024 2:48 PM

## 2024-11-05 NOTE — DISCHARGE INSTRUCTIONS
Follow-up with your primary care doctor for possible MRI imaging of your cervical spine (neck) and shoulder.  Also, if the growth in your left armpit is increasing in size, we recommend follow-up with a possible ultrasound or biopsy.    Follow-up with hand specialist at [facility] for your carpal tunnel syndrome symptoms.    Continue with anti-inflammatories.    Take Flexeril as needed for muscle spasm/pain

## 2024-11-05 NOTE — ED NOTES
Patient discharged home per ERP.  Discharge teaching and education discussed with patient. POC discussed.   Patient verbalized understanding of discharge teaching and education. No other questions at this time.     Rx for flexeril sent to pt's pharmacy.     VSS. Patient alert and oriented. Patient arranged ride for self. Able to ambulate off unit safely with steady gait.

## 2024-11-05 NOTE — ED TRIAGE NOTES
Chief Complaint   Patient presents with    Arm Pain     Left arm numbness x 1 day      40 yo male ambulatory  to triage for above complaint. Hx carpal tunnel, no other neurological  deficients.     Pt is alert and orientated, speaking full sentences, follows commands and responds appropriately  to questions.     Patient placed back lobby and educated on triage process. Asked to inform RN of any changes or concerns.     /68   Pulse 97   Temp 36.2 °C (97.1 °F) (Temporal)   Resp 18   Ht 1.829 m (6')   Wt 85.6 kg (188 lb 11.4 oz)   SpO2 89%   BMI 25.59 kg/m²

## 2025-04-07 ENCOUNTER — OFFICE VISIT (OUTPATIENT)
Dept: URGENT CARE | Facility: CLINIC | Age: 40
End: 2025-04-07
Payer: COMMERCIAL

## 2025-04-07 VITALS
BODY MASS INDEX: 25.47 KG/M2 | DIASTOLIC BLOOD PRESSURE: 72 MMHG | OXYGEN SATURATION: 95 % | HEIGHT: 72 IN | WEIGHT: 188 LBS | RESPIRATION RATE: 16 BRPM | HEART RATE: 87 BPM | SYSTOLIC BLOOD PRESSURE: 118 MMHG | TEMPERATURE: 97.9 F

## 2025-04-07 DIAGNOSIS — R51.9 SCALP PAIN: ICD-10-CM

## 2025-04-07 PROCEDURE — 3078F DIAST BP <80 MM HG: CPT | Performed by: NURSE PRACTITIONER

## 2025-04-07 PROCEDURE — 99213 OFFICE O/P EST LOW 20 MIN: CPT | Performed by: NURSE PRACTITIONER

## 2025-04-07 PROCEDURE — 3074F SYST BP LT 130 MM HG: CPT | Performed by: NURSE PRACTITIONER

## 2025-04-07 RX ORDER — KETOROLAC TROMETHAMINE 15 MG/ML
15 INJECTION, SOLUTION INTRAMUSCULAR; INTRAVENOUS ONCE
Status: COMPLETED | OUTPATIENT
Start: 2025-04-07 | End: 2025-04-07

## 2025-04-07 RX ADMIN — KETOROLAC TROMETHAMINE 15 MG: 15 INJECTION, SOLUTION INTRAMUSCULAR; INTRAVENOUS at 14:35

## 2025-04-07 ASSESSMENT — ENCOUNTER SYMPTOMS
CHILLS: 0
DOUBLE VISION: 0
BLURRED VISION: 0
FEVER: 0
HEADACHES: 1
DIZZINESS: 0

## 2025-04-07 ASSESSMENT — FIBROSIS 4 INDEX: FIB4 SCORE: 0.68

## 2025-04-07 NOTE — LETTER
April 7, 2025         Patient: Bolivar Gary   YOB: 1985   Date of Visit: 4/7/2025           To Whom it May Concern:    Bolivar Gary was seen in my clinic on 4/7/2025. He may return to work on 4/8/25.    If you have any questions or concerns, please don't hesitate to call.        Sincerely,           SAKSHI Gallagher.  Electronically Signed
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
## 2025-04-07 NOTE — PATIENT INSTRUCTIONS
-Oral hydration  -NSAID's as directed for pain. Can also use OTC tylenol.    Follow up with PCP, sooner if rash develops. Follow up emergently for severe uncontrolled headache, neuro changes, vision changes, fever, neck stiffness, uncontrolled vomiting, syncope or near syncope, changes in speech, confusion, weakness, or facial droop.

## 2025-07-18 ENCOUNTER — OFFICE VISIT (OUTPATIENT)
Dept: URGENT CARE | Facility: CLINIC | Age: 40
End: 2025-07-18
Payer: COMMERCIAL

## 2025-07-18 VITALS
DIASTOLIC BLOOD PRESSURE: 98 MMHG | OXYGEN SATURATION: 97 % | HEART RATE: 118 BPM | TEMPERATURE: 97.9 F | SYSTOLIC BLOOD PRESSURE: 138 MMHG | RESPIRATION RATE: 19 BRPM | BODY MASS INDEX: 23.7 KG/M2 | WEIGHT: 175 LBS | HEIGHT: 72 IN

## 2025-07-18 DIAGNOSIS — T24.232A PARTIAL THICKNESS BURN OF LEFT LOWER LEG, INITIAL ENCOUNTER: ICD-10-CM

## 2025-07-18 PROCEDURE — 99213 OFFICE O/P EST LOW 20 MIN: CPT | Performed by: STUDENT IN AN ORGANIZED HEALTH CARE EDUCATION/TRAINING PROGRAM

## 2025-07-18 PROCEDURE — 3080F DIAST BP >= 90 MM HG: CPT | Performed by: STUDENT IN AN ORGANIZED HEALTH CARE EDUCATION/TRAINING PROGRAM

## 2025-07-18 PROCEDURE — 3075F SYST BP GE 130 - 139MM HG: CPT | Performed by: STUDENT IN AN ORGANIZED HEALTH CARE EDUCATION/TRAINING PROGRAM

## 2025-07-18 RX ORDER — SILVER SULFADIAZINE 10 MG/G
CREAM TOPICAL
Qty: 400 G | Refills: 0 | Status: SHIPPED | OUTPATIENT
Start: 2025-07-18 | End: 2025-07-18

## 2025-07-18 RX ORDER — SILVER SULFADIAZINE 10 MG/G
CREAM TOPICAL
Qty: 400 G | Refills: 0 | Status: SHIPPED | OUTPATIENT
Start: 2025-07-18

## 2025-07-18 RX ORDER — CEPHALEXIN 500 MG/1
500 CAPSULE ORAL 3 TIMES DAILY
Qty: 15 CAPSULE | Refills: 0 | Status: SHIPPED | OUTPATIENT
Start: 2025-07-18 | End: 2025-07-23

## 2025-07-18 RX ORDER — CEPHALEXIN 500 MG/1
500 CAPSULE ORAL 3 TIMES DAILY
Qty: 15 CAPSULE | Refills: 0 | Status: SHIPPED | OUTPATIENT
Start: 2025-07-18 | End: 2025-07-18

## 2025-07-18 ASSESSMENT — FIBROSIS 4 INDEX: FIB4 SCORE: 0.7
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
## 2025-07-20 ASSESSMENT — ENCOUNTER SYMPTOMS
FEVER: 0
CHILLS: 0
WEAKNESS: 0
SENSORY CHANGE: 0
TINGLING: 0